# Patient Record
Sex: MALE | URBAN - METROPOLITAN AREA
[De-identification: names, ages, dates, MRNs, and addresses within clinical notes are randomized per-mention and may not be internally consistent; named-entity substitution may affect disease eponyms.]

---

## 2017-05-17 ENCOUNTER — APPOINTMENT (OUTPATIENT)
Age: 27
Setting detail: DERMATOLOGY
End: 2017-05-18

## 2017-05-17 VITALS — WEIGHT: 170 LBS | HEIGHT: 72 IN

## 2017-05-17 DIAGNOSIS — L57.8 OTHER SKIN CHANGES DUE TO CHRONIC EXPOSURE TO NONIONIZING RADIATION: ICD-10-CM

## 2017-05-17 DIAGNOSIS — L942 OTHER SPECIFIED DISORDERS OF SKIN: ICD-10-CM

## 2017-05-17 DIAGNOSIS — L30.9 DERMATITIS, UNSPECIFIED: ICD-10-CM

## 2017-05-17 DIAGNOSIS — L988 OTHER SPECIFIED DISORDERS OF SKIN: ICD-10-CM

## 2017-05-17 PROBLEM — L70.0 ACNE VULGARIS: Status: ACTIVE | Noted: 2017-05-17

## 2017-05-17 PROBLEM — D23.5 OTHER BENIGN NEOPLASM OF SKIN OF TRUNK: Status: ACTIVE | Noted: 2017-05-17

## 2017-05-17 PROCEDURE — OTHER MIPS QUALITY: OTHER

## 2017-05-17 PROCEDURE — OTHER FOLLOW UP FOR NEXT VISIT: OTHER

## 2017-05-17 PROCEDURE — OTHER TREATMENT REGIMEN: OTHER

## 2017-05-17 PROCEDURE — OTHER COUNSELING: OTHER

## 2017-05-17 PROCEDURE — 99202 OFFICE O/P NEW SF 15 MIN: CPT

## 2017-05-17 ASSESSMENT — PAIN INTENSITY VAS: HOW INTENSE IS YOUR PAIN 0 BEING NO PAIN, 10 BEING THE MOST SEVERE PAIN POSSIBLE?: NO PAIN

## 2017-05-17 ASSESSMENT — LOCATION DETAILED DESCRIPTION DERM
LOCATION DETAILED: DORSAL CORONA OF GLANS
LOCATION DETAILED: LEFT CENTRAL MALAR CHEEK
LOCATION DETAILED: RIGHT DORSAL SHAFT OF PENIS

## 2017-05-17 ASSESSMENT — LOCATION SIMPLE DESCRIPTION DERM
LOCATION SIMPLE: LEFT CHEEK
LOCATION SIMPLE: PENIS

## 2017-05-17 ASSESSMENT — LOCATION ZONE DERM
LOCATION ZONE: FACE
LOCATION ZONE: PENIS

## 2017-05-17 ASSESSMENT — SEVERITY ASSESSMENT: SEVERITY: MILD

## 2017-05-17 NOTE — PROCEDURE: MIPS QUALITY
Quality 110: Preventive Care And Screening: Influenza Immunization: Influenza Immunization previously received during influenza season
Quality 47: Advance Care Plan: Advance Care Planning discussed and documented in the medical record; patient did not wish or was not able to name a surrogate decision maker or provide an advance care plan.
Quality 111:Pneumonia Vaccination Status For Older Adults: Pneumococcal Vaccination not Administered or Previously Received, Reason not Otherwise Specified
Detail Level: Detailed

## 2017-05-17 NOTE — PROCEDURE: FOLLOW UP FOR NEXT VISIT
Scheduled For Follow Up In (Optional): Prn
Instructions (Optional): Patient will keep us posted on this problem. If there is any worsening he will need to follow up for further evaluation. I did recommend safe sex practices
Detail Level: Detailed

## 2017-05-17 NOTE — HPI: WARTS (VERRUCA)
How Severe Are Your Warts?: mild
Is This A New Presentation, Or A Follow-Up?: Warts
Additional History: He has had cryotherapy done on his warts. The warts are no longer there but the spot is sensitive. Pt was seen by two different physicians, the first one told him these were consistent with genital warts and he had treatment with cryotherapy. However after a second opinion he told him these were benign penile papules. He has not had any treatment since nor has he had any problems with the lesions. They have not changed over the last year, no increase in size or number. His only complaint is that with sexual activity he will get irritation on the distal penis from the places that he had frozen previously and he is interested in possible treatment options to reduce this irritation

## 2017-05-17 NOTE — PROCEDURE: TREATMENT REGIMEN
Detail Level: Simple
Plan: I did encourage the patient to schedule a follow up at a later date for a full body skin exam. He will consider and schedule at his convenience
Plan: On inspection I did confirm that this is his actual diagnosis, these are not consistent with condyloma and do not require any specific treatment. This is basically a normal anatomic variant
Plan: Other than Aquaphor or Cerave healing ointment for protection I cannot think of any other options to reduce this recurrent abrasive inflammation. Patient will monitor, follow up if there is any worsening. We could consider urologic referral but I don't know that they would be able to help any other way
Detail Level: Generalized